# Patient Record
Sex: MALE | Race: WHITE | NOT HISPANIC OR LATINO | URBAN - METROPOLITAN AREA
[De-identification: names, ages, dates, MRNs, and addresses within clinical notes are randomized per-mention and may not be internally consistent; named-entity substitution may affect disease eponyms.]

---

## 2017-12-14 ENCOUNTER — ALLSCRIPTS OFFICE VISIT (OUTPATIENT)
Dept: OTHER | Facility: OTHER | Age: 37
End: 2017-12-14

## 2017-12-15 NOTE — PROGRESS NOTES
Assessment  1  Acute conjunctivitis of right eye (372 00) (H10 31)   2  Acute URI (465 9) (J06 9)    Plan  Acute conjunctivitis of right eye    · Ofloxacin 0 3 % Ophthalmic Solution; INSTILL 1 TO 2 DROPS IN THE AFFECTEDEYE(S) EVERY 2 TO 4 HOURS WHILE AWAKE FOR 2 DAYS, THEN 1 TO 2DROPS 4 TIMES DAILY FOR 5 DAYS  Need for influenza vaccination    · Stop: Fluzone Quadrivalent 0 5 ML Intramuscular Suspension    Discussion/Summary    Patient is a 72-year-old male1  acute conjunctivitis/ /acute URI - continue with supportive care  Maintain proper hydration to take over-the-counter Mucinex  Start treatment with ofloxacin eye drops  Follow up if symptoms persist       Chief Complaint  Patient presents for cough,eye discharge, congestion x6 days  Patient states he has been taking Tylenol cold and sinus  History of Present Illness  Cold Symptoms:   Kallie Heimlich presents with complaints of gradual onset of constant episodes of mild cold symptoms  Episodes started 5 days ago  His symptoms are probably caused by no known event  Symptoms are improved by OTC cold medications  Associated symptoms include nasal congestion,-- post nasal drainage,-- productive cough-- and-- facial pressure, but-- no sore throat,-- no hoarseness,-- no ear pain,-- no fever-- and-- no chills  Review of Systems   Constitutional: not feeling poorly-- and-- not feeling tired  ENT: sore throat-- and-- nasal discharge  Cardiovascular: no chest pain-- and-- no palpitations  Respiratory: no cough-- and-- no shortness of breath during exertion  Gastrointestinal: no nausea-- and-- no diarrhea  Genitourinary: no dysuria,-- no urinary hesitancy-- and-- no nocturia  Musculoskeletal: no arthralgias-- and-- no myalgias  Integumentary: no rashes-- and-- no skin lesions  Active Problems  1  Need for Tdap vaccination (V06 1) (Z23)   2   Preventative health care (V70 0) (Z00 00)    Past Medical History  Active Problems And Past Medical History Reviewed: The active problems and past medical history were reviewed and updated today  Family History  Mother    1  No pertinent family history  Family History Reviewed: The family history was reviewed and updated today  Social History   · Never a smoker  The social history was reviewed and updated today  The social history was reviewed and is unchanged  Surgical History  Surgical History Reviewed: The surgical history was reviewed and updated today  Current Meds   1  No Reported Medications Recorded    The medication list was reviewed and updated today  Allergies  1  No Known Drug Allergies    Vitals   Recorded: [id number] 10:15AM   Temperature 98 F, Tympanic   Heart Rate 72   Pulse Quality Normal   Respiration Quality Normal   Respiration 16   Systolic 976, RUE, Sitting   Diastolic 86, RUE, Sitting   Height 5 ft 7 5 in   Weight 158 lb 6 oz   BMI Calculated 24 44   BSA Calculated 1 84   O2 Saturation 95, RA   Pain Scale 0     Physical Exam   Constitutional  General appearance: No acute distress, well appearing and well nourished  Eyes  Conjunctiva and lids: Abnormal    Pupils and irises: Equal, round and reactive to light  Ears, Nose, Mouth, and Throat  External inspection of ears and nose: Normal    Otoscopic examination: Tympanic membrance translucent with normal light reflex  Canals patent without erythema  Nasal mucosa, septum, and turbinates: Normal without edema or erythema  Oropharynx: Normal with no erythema, edema, exudate or lesions  Pulmonary  Respiratory effort: No increased work of breathing or signs of respiratory distress  Auscultation of lungs: Clear to auscultation, equal breath sounds bilaterally, no wheezes, no rales, no rhonci  Cardiovascular  Auscultation of heart: Normal rate and rhythm, normal S1 and S2, without murmurs  Examination of extremities for edema and/or varicosities: Normal          Signatures   Electronically signed by :  Tamir Gaviria DO Richard; Dec 14 2017 10:39AM EST                       (Author)

## 2018-01-23 VITALS
OXYGEN SATURATION: 95 % | BODY MASS INDEX: 24 KG/M2 | SYSTOLIC BLOOD PRESSURE: 130 MMHG | TEMPERATURE: 98 F | DIASTOLIC BLOOD PRESSURE: 86 MMHG | HEART RATE: 72 BPM | WEIGHT: 158.38 LBS | HEIGHT: 68 IN | RESPIRATION RATE: 16 BRPM

## 2021-04-13 DIAGNOSIS — Z23 ENCOUNTER FOR IMMUNIZATION: ICD-10-CM
